# Patient Record
Sex: FEMALE | Race: WHITE | NOT HISPANIC OR LATINO | Employment: OTHER | RURAL
[De-identification: names, ages, dates, MRNs, and addresses within clinical notes are randomized per-mention and may not be internally consistent; named-entity substitution may affect disease eponyms.]

---

## 2017-02-15 ENCOUNTER — OFFICE VISIT (OUTPATIENT)
Dept: CARDIOLOGY | Facility: CLINIC | Age: 82
End: 2017-02-15

## 2017-02-15 VITALS
HEIGHT: 63 IN | SYSTOLIC BLOOD PRESSURE: 132 MMHG | DIASTOLIC BLOOD PRESSURE: 76 MMHG | BODY MASS INDEX: 31.89 KG/M2 | WEIGHT: 180 LBS | HEART RATE: 73 BPM

## 2017-02-15 DIAGNOSIS — E78.5 DYSLIPIDEMIA: ICD-10-CM

## 2017-02-15 DIAGNOSIS — I48.0 PAROXYSMAL ATRIAL FIBRILLATION (HCC): ICD-10-CM

## 2017-02-15 DIAGNOSIS — I25.2 HISTORY OF MYOCARDIAL INFARCTION: ICD-10-CM

## 2017-02-15 DIAGNOSIS — Z86.79 HISTORY OF ASCVD (ATHEROSCLEROTIC CARDIOVASCULAR DISEASE): Primary | ICD-10-CM

## 2017-02-15 DIAGNOSIS — I49.5 SICK SINUS SYNDROME (HCC): ICD-10-CM

## 2017-02-15 DIAGNOSIS — I10 ESSENTIAL HYPERTENSION: ICD-10-CM

## 2017-02-15 PROCEDURE — 93000 ELECTROCARDIOGRAM COMPLETE: CPT | Performed by: INTERNAL MEDICINE

## 2017-02-15 PROCEDURE — 99213 OFFICE O/P EST LOW 20 MIN: CPT | Performed by: INTERNAL MEDICINE

## 2017-02-15 NOTE — PROGRESS NOTES
Luciano Padgett MD  Oliva Christy  1/15/1932  02/15/2017    Patient Active Problem List   Diagnosis   • History of ASCVD (atherosclerotic cardiovascular disease)- Coronary bypass graft in 2007, which was 4 vessel.   • HTN (hypertension)- with elevated blood pressure .   • Paroxysmal atrial fibrillation- warfarin for stroke prevention   • Sick sinus syndrome status post permanent pacemaker implantation february 2012 secondary to history of tachybrady syndrome.    • Dyslipidemia- on pravastatin.   • Moderate aortic insufficiency   • History of myocardial infarction 12/2016       Dear Luciano Padgett MD:    Subjective     Oliva Christy is a 85 y.o. female with the problems as listed above, presents for follow up of ASCVD.      History of Present Illness: Patient denies chest pain or difficulty breathing. Patient's daughter reporting pt had MI 12/2016 in [place].  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        Allergies   Allergen Reactions   • Percocet [Oxycodone-Acetaminophen]    • Valium [Diazepam]    :      Current Outpatient Prescriptions:   •  aspirin 81 MG EC tablet, Take  by mouth., Disp: , Rfl:   •  Cyanocobalamin (VITAMIN B-12 CR PO), Take  by mouth., Disp: , Rfl:   •  furosemide (LASIX) 20 MG tablet, Take 20 mg by mouth daily., Disp: , Rfl:   •  gabapentin (NEURONTIN) 300 MG capsule, Take 300 mg by mouth 3 (three) times a day., Disp: , Rfl:   •  HYDROcodone-acetaminophen (VICODIN) 7.5-500 MG per tablet, Take  by mouth., Disp: , Rfl:   •  latanoprost (XALATAN) 0.005 % ophthalmic solution, 1 drop., Disp: , Rfl:   •  metoprolol tartrate (LOPRESSOR) 100 MG tablet, Take  by mouth., Disp: , Rfl:   •  Multiple Vitamins-Minerals (MULTIVITAMIN PO), Take  by mouth., Disp: , Rfl:   •  polycarbophil (FIBER-CAPS) 625 MG tablet, Take  by mouth., Disp: , Rfl:   •  POLYETHYL GLYCOL-POLYVINYL ALC OP, Apply  to eye., Disp: , Rfl:   •  pravastatin (PRAVACHOL) 80 MG tablet, Take 20 mg by mouth daily., Disp: , Rfl:   •   "trimethoprim (TRIMPEX) 100 MG tablet, Take 100 mg by mouth every night., Disp: , Rfl:   •  warfarin (COUMADIN) 1 MG tablet, Take  by mouth., Disp: , Rfl:   •  warfarin (COUMADIN) 5 MG tablet, Take  by mouth., Disp: , Rfl:       The following portions of the patient's history were reviewed and updated as appropriate: allergies, current medications, past family history, past medical history, past social history, past surgical history and problem list.    Social History   Substance Use Topics   • Smoking status: Never Smoker   • Smokeless tobacco: None   • Alcohol use No       Review of Systems   Constitution: Negative for chills and fever.   HENT: Negative for headaches, nosebleeds and sore throat.    Respiratory: Negative for cough, hemoptysis and wheezing.    Gastrointestinal: Negative for abdominal pain, hematemesis, hematochezia, melena, nausea and vomiting.   Genitourinary: Negative for dysuria and hematuria.       Objective   Vitals:    02/15/17 1630   BP: 132/76   BP Location: Left arm   Patient Position: Sitting   Cuff Size: Adult   Pulse: 73   Weight: 180 lb (81.6 kg)   Height: 63\" (160 cm)     Body mass index is 31.89 kg/(m^2).        Physical Exam   Constitutional: She is oriented to person, place, and time. She appears well-developed and well-nourished.   HENT:   Mouth/Throat: Oropharynx is clear and moist.   Eyes: EOM are normal. Pupils are equal, round, and reactive to light.   Neck: Neck supple. No JVD present. No tracheal deviation present. No thyromegaly present.   Cardiovascular: Normal rate, regular rhythm, S1 normal and S2 normal.  Exam reveals no gallop and no friction rub.    No murmur heard.  Pulmonary/Chest: Effort normal and breath sounds normal.   Abdominal: Soft. Bowel sounds are normal. She exhibits no mass. There is no tenderness.   Musculoskeletal: Normal range of motion. She exhibits no edema.   Lymphadenopathy:     She has no cervical adenopathy.   Neurological: She is alert and " oriented to person, place, and time.   Mild weakness in the left upper and lower extremity.   Skin: Skin is warm and dry. No rash noted.   Psychiatric: She has a normal mood and affect.       Lab Results   Component Value Date     05/22/2014    K 3.9 05/22/2014     05/22/2014    CO2 24.0 (L) 05/22/2014    BUN 15 05/22/2014    CREATININE 0.75 05/22/2014    GLUCOSE 137 (H) 05/22/2014    CALCIUM 9.5 05/22/2014    AST 29 05/16/2014    ALT 18 05/16/2014    ALKPHOS 51 05/16/2014    LABIL2 1.4 (L) 05/16/2014     Lab Results   Component Value Date    CKTOTAL 53 05/20/2014     Lab Results   Component Value Date    WBC 4.6 05/21/2014    HGB 12.3 05/21/2014    HCT 38.0 05/21/2014     05/21/2014     Lab Results   Component Value Date    INR 2.36 05/22/2014    INR 1.78 05/21/2014    INR 1.36 05/20/2014     Lab Results   Component Value Date    MG 2.2 05/22/2014     Lab Results   Component Value Date    CHLPL 128 05/15/2014    TRIG 52 05/15/2014    HDL 53 (L) 05/15/2014    LDL 65 05/15/2014      Lab Results   Component Value Date     (H) 05/16/2014     Echo   Lab Results   Component Value Date    ECHOEFEST 65 08/16/2016       Echo Doppler Study revealed:    Interpretation Summary   · Left ventricular function is normal. Estimated EF = 65%.  · All left ventricular wall segments contract normally.  · Normal left ventricular cavity size noted. All left ventricular wall segments contract normally. Left ventricular wall thickness is consistent with mild concentric hypertrophy.  · There is calcification of the noncoronary cusp, left coronary cusp and right coronary cusp present. Moderate aortic valve regurgitation is present. Mild aortic valve stenosis is present.BRANDEN of 1.74cm2  · Mitral annular calcification is present. Mild-to-moderate mitral valve regurgitation is present. The calculated mitral valve morphology score is 9.No signficant mitral stenosis noted.  · Mild-to-moderate mitral valve regurgitation is  present  · Mild to moderate tricuspid valve regurgitation is present. Estimated right ventricular systolic pressure from tricuspid regurgitation is markedly elevated (>55 mmHg).  · There is no evidence of pericardial effusion.             ECG 12 Lead  Date/Time: 2/15/2017 4:31 PM  Performed by: CRISTIAN MAIER  Authorized by: CRISTIAN MAIER   Rhythm: sinus rhythm  Comments: AV sequential pacing            Assessment/Plan :    1. History of ASCVD (atherosclerotic cardiovascular disease)- Coronary bypass graft in 2007, which was 4 vessel.     2. Paroxysmal atrial fibrillation- warfarin for stroke prevention     3. Sick sinus syndrome status post permanent pacemaker implantation february 2012 secondary to history of tachybrady syndrome.      4. History of myocardial infarction 12/2016     5. Essential hypertension     6. Dyslipidemia- on pravastatin.            Recommendations:     Continue current medications.  Return in about 5 months (around 7/15/2017).    As always, I appreciate very much the opportunity to participate in the cardiovascular care of your patients.      With Best Regards,    Cristian Maier MD, [facility]    Dragon disclaimer:  Much of this encounter note is an electronic transcription/translation of spoken language to printed text. The electronic translation of spoken language may permit erroneous, or at times, nonsensical words or phrases to be inadvertently transcribed; Although I have reviewed the note for such errors, some may still exist.